# Patient Record
(demographics unavailable — no encounter records)

---

## 2025-02-19 NOTE — ASSESSMENT
[FreeTextEntry1] : Nocturia x 8.  Multifactorial. Recommend that patient discuss with pulmonologist for sleep apnea management. Explained how sleep apnea can contribute to nighttime urination. He is also on diuretic medications and is drinking liquids after dinner. Recommend behavioral modifications.  Enlarged pannus.  Recommend weight loss.  May require surgical removal in future.  Return to office for renal bladder sonogram to evaluate urinary tract.

## 2025-02-19 NOTE — PHYSICAL EXAM
[General Appearance - In No Acute Distress] : no acute distress [] : no respiratory distress [Normal Station and Gait] : the gait and station were normal for the patient's age [No Focal Deficits] : no focal deficits [Oriented To Time, Place, And Person] : oriented to person, place, and time [FreeTextEntry1] : Morbid obesity. [de-identified] : Enlarged pannus with skin hanging over genital region. [FreeTextEntry2] : Anibal Waterman PA-C

## 2025-02-19 NOTE — HISTORY OF PRESENT ILLNESS
[FreeTextEntry1] : Carlos is a 39-year-old born December 12, 1985 who presents to office with chief complaint of nocturia x 8.  This has been ongoing for greater than 6 months.  He also reports urinary frequency throughout the day.  He has history of obstructive sleep apnea and is not using any management at this time.  He also has history of high blood pressure on diuretic medication and reports that he is prediabetic with a hemoglobin A1c of 6%.  He also has complaints of excess skin near her genital region.

## 2025-02-26 NOTE — PHYSICAL EXAM
[de-identified] : Bilateral EAC with mild cerumen [de-identified] : Bilateral TM intact without VARGAS [Midline] : trachea located in midline position [Normal] : palpation of lymph nodes is normal

## 2025-02-26 NOTE — PHYSICAL EXAM
[de-identified] : Bilateral EAC with mild cerumen [de-identified] : Bilateral TM intact without VARGAS [Midline] : trachea located in midline position [Normal] : palpation of lymph nodes is normal

## 2025-02-26 NOTE — HISTORY OF PRESENT ILLNESS
[de-identified] : Patient presents today c/o ruptured right ear drum. States that he ruptured his right eardrum 3 weeks ago. Denies pain or discomfort. When he popped his ear, he was hearing weird noise. Has stopped hearing noise. Feels he has alteration in speech but feels it is not due to ruptured eardrum. Denies trouble hearing. Feels shortness of breath at times. Denies dizziness or lightheadedness.

## 2025-02-26 NOTE — ASSESSMENT
[FreeTextEntry1] : RIght TM perforation from trauma. Healed today.   normal.  Follow up PRN.      Total time spent on patient encounter including review of patient's medical history, physical examination, interpretation of any indicated labs / imaging and counseling, excluding time spent performing any indicated procedures: 38 minutes

## 2025-02-26 NOTE — HISTORY OF PRESENT ILLNESS
[de-identified] : Patient presents today c/o ruptured right ear drum. States that he ruptured his right eardrum 3 weeks ago. Denies pain or discomfort. When he popped his ear, he was hearing weird noise. Has stopped hearing noise. Feels he has alteration in speech but feels it is not due to ruptured eardrum. Denies trouble hearing. Feels shortness of breath at times. Denies dizziness or lightheadedness.